# Patient Record
(demographics unavailable — no encounter records)

---

## 2024-10-08 NOTE — HISTORY OF PRESENT ILLNESS
[FreeTextEntry1] : Here for annual [de-identified] : No new complaints had episode retention/ painful eft biceps cannot life after grabbed crate at work

## 2024-10-08 NOTE — HEALTH RISK ASSESSMENT
[Very Good] : ~his/her~  mood as very good [No falls in past year] : Patient reported no falls in the past year [0] : 2) Feeling down, depressed, or hopeless: Not at all (0) [PHQ-2 Negative - No further assessment needed] : PHQ-2 Negative - No further assessment needed [Never] : Never [NO] : No [Patient reported colonoscopy was normal] : Patient reported colonoscopy was normal [HIV test declined] : HIV test declined [Hepatitis C test declined] : Hepatitis C test declined [None] : None [With Family] : lives with family [Employed] : employed [Sexually Active] : sexually active [Feels Safe at Home] : Feels safe at home [Fully functional (bathing, dressing, toileting, transferring, walking, feeding)] : Fully functional (bathing, dressing, toileting, transferring, walking, feeding) [Fully functional (using the telephone, shopping, preparing meals, housekeeping, doing laundry, using] : Fully functional and needs no help or supervision to perform IADLs (using the telephone, shopping, preparing meals, housekeeping, doing laundry, using transportation, managing medications and managing finances) [No] : No [1 or 2 (0 pts)] : 1 or 2 (0 points) [Never (0 pts)] : Never (0 points) [] :  [Audit-CScore] : 2 [de-identified] : Works out at [employer] [de-identified] : Good  [[id number]] : 0 [Change in mental status noted] : No change in mental status noted [Language] : denies difficulty with language [Behavior] : denies difficulty with behavior [Learning/Retaining New Information] : denies difficulty learning/retaining new information [Handling Complex Tasks] : denies difficulty handling complex tasks [Reasoning] : denies difficulty with reasoning [Spatial Ability and Orientation] : denies difficulty with spatial ability and orientation [Reports changes in hearing] : Reports no changes in hearing [Reports changes in vision] : Reports no changes in vision [Reports changes in dental health] : Reports no changes in dental health [ColonoscopyDate] : 2023 [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

## 2024-11-20 NOTE — PHYSICAL EXAM
[Normal Sclera/Conjunctiva] : normal sclera/conjunctiva [Normal Oropharynx] : the oropharynx was normal [No Carotid Bruits] : no carotid bruits [No Edema] : there was no peripheral edema [Soft] : abdomen soft [Non Tender] : non-tender [Non-distended] : non-distended [No Focal Deficits] : no focal deficits [Normal Gait] : normal gait [Alert and Oriented x3] : oriented to person, place, and time [Normal] : affect was normal and insight and judgment were intact

## 2024-11-20 NOTE — HISTORY OF PRESENT ILLNESS
[FreeTextEntry8] : 60-year-old male presents with new onset dizziness and light-headedness occurring intermittently over the past few days. Symptoms occur randomly without clear triggers. Denies CP, dyspnea, palpitations, pre-syncope. Current medications include daily Cialis for urinary symptoms. Patient tried stays hydrated with water and coffee intake. He denies smoking and alcohol use, but reports significant sugar intake including approximately six donuts daily.

## 2024-11-20 NOTE — PLAN
[FreeTextEntry1] : Assessment: - Dizziness/Light-headedness, new onset    * Differential considerations include:       - Cardiovascular: arrhythmia, orthostatic hypotension       - Medication side effect (Cialis)       - Metabolic: blood sugar fluctuations given high sugar intake       - Vestibular causes    * Low concern for acute emergency given normal vital signs and exam    * Notable risk factors: age, high sugar intake  Plan: 1. Cardiology Referral   - Scheduled for outpatient evaluation given age and symptoms    2. Lifestyle Modifications    - Reduce sugar intake, particularly refined sugars    - Consider food/symptom diary to track correlation    - Maintain adequate hydration, for men close to 3L of water intake per day.  3. Medication Review    - Continue Cialis for now    - Will reassess need/dosing after cardiology evaluation  4. Return Precautions    - Return immediately if symptoms worsen, develop chest pain, palpitations, near-syncope/syncope    - Follow up in 1-2 weeks if symptoms persist or sooner if worsening

## 2025-02-09 NOTE — ASSESSMENT
[FreeTextEntry1] : I have asked YANICK to undergo detailed cardiac testing in order to evaluate his  overall cardiovascular risk. An assessment of both structural and functional heart disease was recommended to the patient. In this regard, a stress test was advised to the patient. I await the upcoming noninvasive cardiac testing in order to assess his overall cardiovascular risk. We discussed the pros and cons of plain treadmill stress testing nuclear stress testing and angiography including a sensitivity analysis. We discussed current ACC guidelines, and the calculated 10-year risk is approximately   10%.  We have reviewed prior testing including cardiac CTA more than half of the face-to-face encounter of 45 minutes was spent in counseling the patient with respect to chest pain shortness of breath and cardiovascular risk  Quality measures  Tobacco intervention not indicated Statin for prevention of cardiovascular disease indicated would encourage Severo to reinstitute the statin Hypertension compensated Aspirin for ischemic vascular disease not indicated Tobacco screening cessation and intervention not indicated  Medical necessity This is a high-risk encounter based upon two or more chronic illnesses with mild exacerbation requiring further management and evaluation.   EKG is indicated for evaluation of chest pains and shortness of breath  this patient has a intermediate risk for major adverse cardiac events based upon [place] risk.  risks benefits alternatives were discussed with the patient. all questions were answered to His satisfaction.

## 2025-02-09 NOTE — CARDIOLOGY SUMMARY
[de-identified] : 2/7/2025 normal sinus rhythm [de-identified] : 11/15/18 9' normal MPI 2/4/22 normal ETT [de-identified] : 7/8/21 EF .68 [de-identified] : 9/29/23 calcium score 146 aortic root 4.2

## 2025-02-09 NOTE — ASSESSMENT
[FreeTextEntry1] : I have asked YANICK to undergo detailed cardiac testing in order to evaluate his  overall cardiovascular risk. An assessment of both structural and functional heart disease was recommended to the patient. In this regard, a stress test was advised to the patient. I await the upcoming noninvasive cardiac testing in order to assess his overall cardiovascular risk. We discussed the pros and cons of plain treadmill stress testing nuclear stress testing and angiography including a sensitivity analysis. We discussed current ACC guidelines, and the calculated 10-year risk is approximately   10%.  We have reviewed prior testing including cardiac CTA more than half of the face-to-face encounter of 45 minutes was spent in counseling the patient with respect to chest pain shortness of breath and cardiovascular risk  Quality measures  Tobacco intervention not indicated Statin for prevention of cardiovascular disease indicated would encourage Severo to reinstitute the statin Hypertension compensated Aspirin for ischemic vascular disease not indicated Tobacco screening cessation and intervention not indicated  Medical necessity This is a high-risk encounter based upon two or more chronic illnesses with mild exacerbation requiring further management and evaluation.   EKG is indicated for evaluation of chest pains and shortness of breath  this patient has a intermediate risk for major adverse cardiac events based upon [place] risk.  risks benefits alternatives were discussed with the patient. all questions were answered to His satisfaction.

## 2025-02-09 NOTE — REASON FOR VISIT
[Symptom and Test Evaluation] : symptom and test evaluation [CV Risk Factors and Non-Cardiac Disease] : CV risk factors and non-cardiac disease [Coronary Artery Disease] : coronary artery disease [FreeTextEntry3] : Dr Reyes [FreeTextEntry1] :  YANICK OLIVAS comes today for evaluation. He was advised to undergo a complete cardiac evaluation. He denies or loss of consciousness but has noted chest pains and shortness of breath. The quality of the pain is localized to the anterior chest. The severity is mild to moderate. He says "I think its gas. 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 He put on weight on holidays and has not "been good about taking the statin. "

## 2025-02-09 NOTE — CARDIOLOGY SUMMARY
[de-identified] : 2/7/2025 normal sinus rhythm [de-identified] : 11/15/18 9' normal MPI 2/4/22 normal ETT [de-identified] : 7/8/21 EF .68 [de-identified] : 9/29/23 calcium score 146 aortic root 4.2

## 2025-02-09 NOTE — REASON FOR VISIT
[Symptom and Test Evaluation] : symptom and test evaluation [CV Risk Factors and Non-Cardiac Disease] : CV risk factors and non-cardiac disease [Coronary Artery Disease] : coronary artery disease [FreeTextEntry3] : Dr Reyes [FreeTextEntry1] :  YANICK OLIVAS comes today for evaluation. He was advised to undergo a complete cardiac evaluation. He denies or loss of consciousness but has noted chest pains and shortness of breath. The quality of the pain is localized to the anterior chest. The severity is mild to moderate. He says "I think its gas. 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 He put on weight on holidays and has not "been good about taking the statin. "